# Patient Record
Sex: FEMALE | Race: WHITE | NOT HISPANIC OR LATINO | Employment: OTHER | URBAN - METROPOLITAN AREA
[De-identification: names, ages, dates, MRNs, and addresses within clinical notes are randomized per-mention and may not be internally consistent; named-entity substitution may affect disease eponyms.]

---

## 2020-09-17 ENCOUNTER — OFFICE VISIT (OUTPATIENT)
Dept: GASTROENTEROLOGY | Facility: CLINIC | Age: 70
End: 2020-09-17
Payer: MEDICARE

## 2020-09-17 VITALS — BODY MASS INDEX: 16.81 KG/M2 | HEIGHT: 67 IN | WEIGHT: 107.13 LBS

## 2020-09-17 DIAGNOSIS — Z92.89 HISTORY OF USE OF ALTERNATIVE MEDICINE: ICD-10-CM

## 2020-09-17 DIAGNOSIS — R62.7 POOR WEIGHT GAIN IN ADULT: Primary | ICD-10-CM

## 2020-09-17 PROCEDURE — 99999 PR PBB SHADOW E&M-NEW PATIENT-LVL III: CPT | Mod: PBBFAC,,, | Performed by: INTERNAL MEDICINE

## 2020-09-17 PROCEDURE — 99203 OFFICE O/P NEW LOW 30 MIN: CPT | Mod: S$GLB,,, | Performed by: INTERNAL MEDICINE

## 2020-09-17 PROCEDURE — 99999 PR PBB SHADOW E&M-NEW PATIENT-LVL III: ICD-10-PCS | Mod: PBBFAC,,, | Performed by: INTERNAL MEDICINE

## 2020-09-17 PROCEDURE — 99203 PR OFFICE/OUTPT VISIT, NEW, LEVL III, 30-44 MIN: ICD-10-PCS | Mod: S$GLB,,, | Performed by: INTERNAL MEDICINE

## 2020-09-17 RX ORDER — CREAM BASE NO.91
CREAM (GRAM) MISCELLANEOUS
COMMUNITY

## 2020-09-17 NOTE — PROGRESS NOTES
GASTROENTEROLOGY CLINIC NOTE    Reason for visit: The primary encounter diagnosis was Poor weight gain in adult. A diagnosis of History of use of alternative medicine was also pertinent to this visit.  Referring provider/PCP: Baltazar Meng MD    HPI:  Marie Dickson is a 70 y.o. female here today for inability to gain weight. New patient.  Hx of RA, recently stopped MTX.       Has a 10 year history of weight loss.  Although recently she states that her weight has been overall neutral over the past 3-5 years she states.  However she is concerned that she has not been able to gain any weight.  She is very heavily involved in alternative medicines. Seeing a  out in [place], meeting online.   Now on supplements. Extensive testing online [facility].     This  recommended multiple test from the [employer] panel.    She has undergone previous workup in the past with a GI doctor.  We do not have these records.  It was Dr. See.  She was told that she has increased fat in her stool.  She believes she was tried on Creon but that did not help.  She does not know if she has ever had a CT to look at her pancreas.  She has had prior upper and lower endoscopy back in 2017, she comments overall these seemed to be normal.  Otherwise no current GI symptoms.  No diarrhea, no vomiting, no blood in stool, no bowel changes, no abdominal pain.    Sees  dr. Baltazar meng. At [facility].   She confirms she recently had multiple blood tests and were all fairly normal.  She states she has had prior celiac testing and was negative.      Dad -  4 years ago at age 105  Mom -          Prior Endoscopy:  EGD:  2017  She believes normal    Colon:    [facility] - unsure, she thinks she was told to come back 5 years?    (Portions of this note were dictated using voice recognition software and may contain dictation related errors in spelling/grammar/syntax not found on text  review)    Review of Systems   Constitutional: Positive for weight loss. Negative for fever and malaise/fatigue.   HENT: Negative for nosebleeds and sore throat.    Eyes: Negative for double vision and photophobia.   Respiratory: Negative for cough and shortness of breath.    Cardiovascular: Negative for chest pain and leg swelling.   Gastrointestinal: Negative for abdominal pain, blood in stool, nausea and vomiting.   Genitourinary: Negative for dysuria and hematuria.   Musculoskeletal: Negative for joint pain and neck pain.   Skin: Negative for itching and rash.   Neurological: Negative for dizziness and headaches.   Psychiatric/Behavioral: Negative for hallucinations. The patient does not have insomnia.        Past Medical History: has a past medical history of Anxiety, DJD (degenerative joint disease), and Dry mouth.    Past Surgical History: has a past surgical history that includes Cosmetic surgery and  section.    Family History:family history includes Heart disease in her father; Inflammatory bowel disease in her mother; Stroke in her mother.    Allergies: Review of patient's allergies indicates:  No Known Allergies    Social History: reports that she has never smoked. She does not have any smokeless tobacco history on file. She reports current alcohol use of about 2.0 standard drinks of alcohol per week. She reports that she does not use drugs.    Home medications:   Current Outpatient Medications on File Prior to Visit   Medication Sig Dispense Refill    alendronate (FOSAMAX) 40 mg tablet Take 40 mg by mouth every 7 days.      cream base no.91, bulk, (HRT BASE BOTANICAL) Crea by Misc.(Non-Drug; Combo Route) route.      lorazepam (ATIVAN) 1 MG tablet Take 0.5 mg by mouth every evening.       progesterone (PROMETRIUM) 100 MG capsule Take 1 capsule (100 mg total) by mouth nightly. 12 capsule 11    UNABLE TO FIND Megapan      UNABLE TO FIND Paramin      UNABLE TO FIND Endoveggies      UNABLE TO  "FIND GB 3      [DISCONTINUED] estradiol (VIVELLE-DOT) 0.1 mg/24 hr PTSW Apply 1 patch to skin twice weekly as directed. (Patient not taking: Reported on 9/17/2020) 8 patch 12    [DISCONTINUED] KELP ORAL Take by mouth.      [DISCONTINUED] ketoconazole (NIZORAL) 200 mg Tab   0    [DISCONTINUED] LACTOBACILLUS ACIDOPHILUS (ACIDOPHILUS ORAL) Take by mouth.      [DISCONTINUED] meloxicam (MOBIC) 7.5 MG tablet   2    [DISCONTINUED] SYNTHROID 25 mcg tablet   2     No current facility-administered medications on file prior to visit.        Vital signs:  Ht 5' 7" (1.702 m)   Wt 48.6 kg (107 lb 2.3 oz)   BMI 16.78 kg/m²     Physical Exam  Vitals signs reviewed.   Constitutional:       General: She is not in acute distress.     Appearance: She is not diaphoretic.      Comments: Very thin appearing   HENT:      Head: Normocephalic and atraumatic.   Eyes:      General: No scleral icterus.     Conjunctiva/sclera: Conjunctivae normal.   Neck:      Musculoskeletal: Neck supple.   Cardiovascular:      Rate and Rhythm: Normal rate.      Heart sounds: Normal heart sounds.   Pulmonary:      Effort: Pulmonary effort is normal. No respiratory distress.      Breath sounds: Normal breath sounds. No stridor.   Abdominal:      General: There is no distension.      Palpations: Abdomen is soft. There is no mass.      Tenderness: There is no abdominal tenderness. There is no guarding or rebound.   Musculoskeletal:         General: No tenderness or deformity.   Lymphadenopathy:      Cervical: No cervical adenopathy.   Skin:     General: Skin is warm and dry.      Findings: No rash.   Neurological:      Mental Status: She is alert and oriented to person, place, and time.      Gait: Gait normal.   Psychiatric:         Mood and Affect: Mood normal.         Behavior: Behavior normal.         Routine labs:  Lab Results   Component Value Date    WBC 8.14 09/17/2008    HGB 13.9 09/17/2008    HCT 42.3 09/17/2008    MCV 99.1 (H) 09/17/2008    PLT " 270 09/17/2008     No results found for: INR  No results found for: IRON, FERRITIN, TIBC, FESATURATED  Lab Results   Component Value Date     09/17/2008    K 4.2 09/17/2008     09/17/2008    CO2 25 09/17/2008    BUN 18 09/17/2008    CREATININE 0.9 09/17/2008     No results found for: ALBUMIN, ALT, AST, GGT, ALKPHOS, BILITOT  No results found for: GLUCOSE    I have reviewed prior labs, imaging, notes from last month      Assessment:  1. Poor weight gain in adult    2. History of use of alternative medicine      She is requesting [facility] testing to include:  NutrEval Plasma  GI effects  Celiac / sensitivity testing.    Plan:     Will arrange testing thru [facility]    Obtain records from [facility] regarding prior GI workup / procedures.    I think it would be reasonable to consider CT panc protocol in future if truly had excess fat in stool. Would also re-consider trial of zenpep.    RTC based on these results.       Sen Espinoza MD  [facility] [place]

## 2020-09-18 ENCOUNTER — PATIENT MESSAGE (OUTPATIENT)
Dept: GASTROENTEROLOGY | Facility: CLINIC | Age: 70
End: 2020-09-18

## 2020-10-16 ENCOUNTER — PATIENT MESSAGE (OUTPATIENT)
Dept: GASTROENTEROLOGY | Facility: CLINIC | Age: 70
End: 2020-10-16

## 2020-10-22 ENCOUNTER — TELEPHONE (OUTPATIENT)
Dept: GASTROENTEROLOGY | Facility: CLINIC | Age: 70
End: 2020-10-22

## 2020-10-22 NOTE — TELEPHONE ENCOUNTER
Spoke with pt and she is having diarrhea. Sooner appointment scheduled on 10/26/20 at 8:00am for a virtual visit.

## 2020-10-22 NOTE — TELEPHONE ENCOUNTER
----- Message from Alem Valladares sent at 10/22/2020  3:17 PM CDT -----  Contact: SELF [phone]  Patient would like to speak with you about being seen sooner Please advise

## 2020-10-23 ENCOUNTER — PATIENT MESSAGE (OUTPATIENT)
Dept: GASTROENTEROLOGY | Facility: CLINIC | Age: 70
End: 2020-10-23

## 2020-11-05 ENCOUNTER — OFFICE VISIT (OUTPATIENT)
Dept: GASTROENTEROLOGY | Facility: CLINIC | Age: 70
End: 2020-11-05
Payer: MEDICARE

## 2020-11-05 DIAGNOSIS — K86.81 EXOCRINE PANCREATIC INSUFFICIENCY: Primary | ICD-10-CM

## 2020-11-05 PROCEDURE — 99214 OFFICE O/P EST MOD 30 MIN: CPT | Mod: 95,,, | Performed by: INTERNAL MEDICINE

## 2020-11-05 PROCEDURE — 99214 PR OFFICE/OUTPT VISIT, EST, LEVL IV, 30-39 MIN: ICD-10-PCS | Mod: 95,,, | Performed by: INTERNAL MEDICINE

## 2020-11-05 RX ORDER — PANCRELIPASE 36000; 180000; 114000 [USP'U]/1; [USP'U]/1; [USP'U]/1
1 CAPSULE, DELAYED RELEASE PELLETS ORAL
Qty: 90 CAPSULE | Refills: 2 | Status: SHIPPED | OUTPATIENT
Start: 2020-11-05 | End: 2020-12-05

## 2020-11-05 NOTE — PROGRESS NOTES
The patient location is: [place]  The chief complaint leading to consultation is: follow up diarrhea    Visit type: audiovisual    Face to Face time with patient: 20  59 minutes of total time spent on the encounter, which includes face to face time and non-face to face time preparing to see the patient (eg, review of tests), Obtaining and/or reviewing separately obtained history, Documenting clinical information in the electronic or other health record, Independently interpreting results (not separately reported) and communicating results to the patient/family/caregiver, or Care coordination (not separately reported).         Each patient to whom he or she provides medical services by telemedicine is:  (1) informed of the relationship between the physician and patient and the respective role of any other health care provider with respect to management of the patient; and (2) notified that he or she may decline to receive medical services by telemedicine and may withdraw from such care at any time.         GASTROENTEROLOGY CLINIC NOTE    Reason for visit: The encounter diagnosis was Exocrine pancreatic insufficiency.  Referring provider/PCP: Baltazar Meng MD    HPI:  Marie Dickson is a 70 y.o. female here today for inability to gain weight follow up    Interval  She had COVID couple weeks ago, had only diarrhea and fatigue type symptoms. Symptoms now resolved but still continues with her chronic loose stools. Unable to gain wt although on restricted diet. Avoids gluten. No fevers. No blood in stool. Feels she cant digest fat.  Reviewed her lyric tests with her. Will send her copies.    ================================  initital clinic visit    Has a 10 year history of weight loss.  Although recently she states that her weight has been overall neutral over the past 3-5 years she states.  However she is concerned that she has not been able to gain any weight.  She is very heavily involved in alternative  medicines. Seeing a  out in [place], meeting online.   Now on supplements. Extensive testing online [facility].     This  recommended multiple test from the [employer] diagnostics panel.    She has undergone previous workup in the past with a GI doctor.  We do not have these records.  It was Dr. See.  She was told that she has increased fat in her stool.  She believes she was tried on Creon but that did not help.  She does not know if she has ever had a CT to look at her pancreas.  She has had prior upper and lower endoscopy back in 2017, she comments overall these seemed to be normal.  Otherwise no current GI symptoms.  No diarrhea, no vomiting, no blood in stool, no bowel changes, no abdominal pain.    Sees nadeen jessica. At [facility].   She confirms she recently had multiple blood tests and were all fairly normal.  She states she has had prior celiac testing and was negative.      Dad -  4 years ago at age 105  Mom -          Prior Endoscopy:  EGD:  2017  She believes normal    Colon:    [facility] - unsure, she thinks she was told to come back 5 years?    (Portions of this note were dictated using voice recognition software and may contain dictation related errors in spelling/grammar/syntax not found on text review)    Review of Systems   Constitutional: Negative for fever and malaise/fatigue.   Respiratory: Negative for cough and shortness of breath.    Cardiovascular: Negative for chest pain and palpitations.   Gastrointestinal: Positive for diarrhea. Negative for abdominal pain, blood in stool, nausea and vomiting.   Neurological: Negative for dizziness and headaches.       Past Medical History: has a past medical history of Anxiety, DJD (degenerative joint disease), and Dry mouth.    Past Surgical History: has a past surgical history that includes Cosmetic surgery and  section.    Family History:family history includes Heart disease in her father;  Inflammatory bowel disease in her mother; Stroke in her mother.    Allergies: Review of patient's allergies indicates:  No Known Allergies    Social History: reports that she has never smoked. She does not have any smokeless tobacco history on file. She reports current alcohol use of about 2.0 standard drinks of alcohol per week. She reports that she does not use drugs.    Home medications:   Current Outpatient Medications on File Prior to Visit   Medication Sig Dispense Refill    alendronate (FOSAMAX) 40 mg tablet Take 40 mg by mouth every 7 days.      cream base no.91, bulk, (HRT BASE BOTANICAL) Crea by Misc.(Non-Drug; Combo Route) route.      lorazepam (ATIVAN) 1 MG tablet Take 0.5 mg by mouth every evening.       progesterone (PROMETRIUM) 100 MG capsule Take 1 capsule (100 mg total) by mouth nightly. 12 capsule 11    UNABLE TO FIND Megapan      UNABLE TO FIND Paramin      UNABLE TO FIND Endoveggies      UNABLE TO FIND GB 3       No current facility-administered medications on file prior to visit.        Vital signs:  There were no vitals taken for this visit.    Physical Exam  Vitals signs reviewed.   Constitutional:       Appearance: She is not toxic-appearing.   HENT:      Head: Normocephalic and atraumatic.   Eyes:      General: No scleral icterus.     Conjunctiva/sclera: Conjunctivae normal.   Neurological:      General: No focal deficit present.      Mental Status: She is alert and oriented to person, place, and time.   Psychiatric:         Mood and Affect: Mood normal.         Behavior: Behavior normal.         Routine labs:  Lab Results   Component Value Date    WBC 8.14 09/17/2008    HGB 13.9 09/17/2008    HCT 42.3 09/17/2008    MCV 99.1 (H) 09/17/2008     09/17/2008     No results found for: INR  No results found for: IRON, FERRITIN, TIBC, FESATURATED  Lab Results   Component Value Date     09/17/2008    K 4.2 09/17/2008     09/17/2008    CO2 25 09/17/2008    BUN 18 09/17/2008     CREATININE 0.9 09/17/2008     No results found for: ALBUMIN, ALT, AST, GGT, ALKPHOS, BILITOT  No results found for: GLUCOSE    I have reviewed prior labs, imaging, notes from last month    Review of prior old records:  2012 colon navid narayan small stomach polyps, biopsied erthema in stomach, biopsied gastritis nromal duodenum, biopsied PH = 2   colon PCF diverticulosis IC valve and TI biopsies normal colon mucosa, biopsied small polyps in rectum, biopsied   2017 egd / colon egd with HH from 39 to 44 duo , stomach and esophagus biospied colon - normal, biopsies for micro colitis   CT 2017 underdistended sigmoid degenerative lumbar spine   2017 esophagram unremarkable        Assessment:  1. Exocrine pancreatic insufficiency      Reviewed [facility] tests.  Will send to her alternative doctor in [facility] and also send copies to her.    Plan:  Orders Placed This Encounter    lipase-protease-amylase (CREON) 36,000-114,000- 180,000 unit CpDR     Awaiting stool studies    We discussed trial of Creon   - she had CT scan 2017 with / without and no signs of significant pathology, and these symptoms were present and unchanged at that time, thus at this time, we discussed that we do not need to repeat CT scan, but will consider if she has worsening or change of symptoms.      RTC based on these results.       Sen Espinoza MD  [facility] [place]    I spent 39 minutes revieweing and summarizing her prior medical records prior to todays visit , as summarized and mentioned above.

## 2020-11-06 ENCOUNTER — TELEPHONE (OUTPATIENT)
Dept: GASTROENTEROLOGY | Facility: CLINIC | Age: 70
End: 2020-11-06

## 2020-11-06 NOTE — TELEPHONE ENCOUNTER
----- Message from Danelle Montes MA sent at 11/5/2020  3:58 PM CST -----  [facility]  Caller: Unspecified (Today,  3:32 PM)         Type:  Needs Medical Advice     Who Called: patient   Reason: Doctors email to send results of lyric testing   Would the patient rather a call back or a response via [facility]? Email   Best Call Back Number: [email or web address]   Additional Information: Dr. Alejandra Meng number [phone]

## 2020-11-06 NOTE — TELEPHONE ENCOUNTER
----- Message from Geri Nicholson sent at 11/6/2020 11:14 AM CST -----  JYOTI BORDEN calling to give fax # to Dr. Alejandra Meng fax # [phone]@[email or web address]    Call back [phone]

## 2020-11-09 ENCOUNTER — TELEPHONE (OUTPATIENT)
Dept: GASTROENTEROLOGY | Facility: CLINIC | Age: 70
End: 2020-11-09

## 2020-11-09 NOTE — TELEPHONE ENCOUNTER
----- Message from Rj Dhaliwal sent at 11/9/2020 10:07 AM CST -----  Regarding: Records  Type:  Needs Medical Advice    Who Called:  patient  Would the patient rather a call back or a response via [facility]?  Call back  Best Call Back Number:  [phone]  Additional Information:  please mail her records form [facility] to her home

## 2020-12-04 ENCOUNTER — TELEPHONE (OUTPATIENT)
Dept: GASTROENTEROLOGY | Facility: CLINIC | Age: 70
End: 2020-12-04

## 2020-12-04 NOTE — TELEPHONE ENCOUNTER
Patient called requesting results from [employer]. Informed pt that we have not received any more results from them. Pt said that she will contact them and have them to fax the results to us.

## 2020-12-04 NOTE — TELEPHONE ENCOUNTER
----- Message from Tari Montes sent at 12/4/2020  2:10 PM CST -----  Contact: [phone]  Pt calling to speak with someone regarding her results. Please call the pt regarding her concerns.

## 2020-12-09 ENCOUNTER — TELEPHONE (OUTPATIENT)
Dept: GASTROENTEROLOGY | Facility: HOSPITAL | Age: 70
End: 2020-12-09

## 2020-12-09 NOTE — TELEPHONE ENCOUNTER
Called and discussed recent [facility]ipul results.    All questions answered.    Please mail her the results after we upload them to media.

## 2020-12-10 ENCOUNTER — TELEPHONE (OUTPATIENT)
Dept: GASTROENTEROLOGY | Facility: CLINIC | Age: 70
End: 2020-12-10

## 2020-12-10 NOTE — TELEPHONE ENCOUNTER
----- Message from Pamela Loaiza sent at 12/10/2020  3:13 PM CST -----  Contact: [phone]  Who Called: PT  Regarding: test results    Would the patient rather a call back or a response via [facility]? Call back  Best Call Back Number: [phone]  Additional Information: would like results faxed to pcp DR Ilene Meng fax [phone] @[email or web address]

## 2020-12-15 ENCOUNTER — TELEPHONE (OUTPATIENT)
Dept: GASTROENTEROLOGY | Facility: CLINIC | Age: 70
End: 2020-12-15

## 2021-04-16 ENCOUNTER — PATIENT MESSAGE (OUTPATIENT)
Dept: RESEARCH | Facility: HOSPITAL | Age: 71
End: 2021-04-16

## 2022-12-09 ENCOUNTER — OFFICE VISIT (OUTPATIENT)
Dept: URGENT CARE | Facility: CLINIC | Age: 72
End: 2022-12-09
Payer: MEDICARE

## 2022-12-09 VITALS
OXYGEN SATURATION: 96 % | DIASTOLIC BLOOD PRESSURE: 62 MMHG | RESPIRATION RATE: 14 BRPM | HEIGHT: 66 IN | BODY MASS INDEX: 16.88 KG/M2 | TEMPERATURE: 98 F | SYSTOLIC BLOOD PRESSURE: 117 MMHG | HEART RATE: 69 BPM | WEIGHT: 105 LBS

## 2022-12-09 DIAGNOSIS — J47.1 BRONCHIECTASIS WITH ACUTE EXACERBATION: Primary | ICD-10-CM

## 2022-12-09 DIAGNOSIS — R05.9 COUGH, UNSPECIFIED TYPE: ICD-10-CM

## 2022-12-09 PROBLEM — N95.2 ATROPHIC VAGINITIS: Status: ACTIVE | Noted: 2018-08-24

## 2022-12-09 PROBLEM — N60.11 DIFFUSE CYSTIC MASTOPATHY OF BOTH BREASTS: Status: ACTIVE | Noted: 2020-01-27

## 2022-12-09 PROBLEM — N60.12 DIFFUSE CYSTIC MASTOPATHY OF BOTH BREASTS: Status: ACTIVE | Noted: 2020-01-27

## 2022-12-09 PROBLEM — Z79.890 HORMONE REPLACEMENT THERAPY: Status: ACTIVE | Noted: 2020-01-27

## 2022-12-09 PROBLEM — N95.1 SYMPTOMATIC MENOPAUSAL OR FEMALE CLIMACTERIC STATES: Status: ACTIVE | Noted: 2020-01-27

## 2022-12-09 LAB
CTP QC/QA: YES
SARS-COV-2 AG RESP QL IA.RAPID: NEGATIVE

## 2022-12-09 PROCEDURE — 87811 SARS CORONAVIRUS 2 ANTIGEN POCT, MANUAL READ: ICD-10-PCS | Mod: QW,S$GLB,, | Performed by: FAMILY MEDICINE

## 2022-12-09 PROCEDURE — 99214 OFFICE O/P EST MOD 30 MIN: CPT | Mod: S$GLB,,, | Performed by: FAMILY MEDICINE

## 2022-12-09 PROCEDURE — 71046 X-RAY EXAM CHEST 2 VIEWS: CPT | Mod: S$GLB,,, | Performed by: RADIOLOGY

## 2022-12-09 PROCEDURE — 3008F BODY MASS INDEX DOCD: CPT | Mod: CPTII,S$GLB,, | Performed by: FAMILY MEDICINE

## 2022-12-09 PROCEDURE — 1125F PR PAIN SEVERITY QUANTIFIED, PAIN PRESENT: ICD-10-PCS | Mod: CPTII,S$GLB,, | Performed by: FAMILY MEDICINE

## 2022-12-09 PROCEDURE — 3008F PR BODY MASS INDEX (BMI) DOCUMENTED: ICD-10-PCS | Mod: CPTII,S$GLB,, | Performed by: FAMILY MEDICINE

## 2022-12-09 PROCEDURE — 1125F AMNT PAIN NOTED PAIN PRSNT: CPT | Mod: CPTII,S$GLB,, | Performed by: FAMILY MEDICINE

## 2022-12-09 PROCEDURE — 3078F DIAST BP <80 MM HG: CPT | Mod: CPTII,S$GLB,, | Performed by: FAMILY MEDICINE

## 2022-12-09 PROCEDURE — 1159F MED LIST DOCD IN RCRD: CPT | Mod: CPTII,S$GLB,, | Performed by: FAMILY MEDICINE

## 2022-12-09 PROCEDURE — 1160F RVW MEDS BY RX/DR IN RCRD: CPT | Mod: CPTII,S$GLB,, | Performed by: FAMILY MEDICINE

## 2022-12-09 PROCEDURE — 99214 PR OFFICE/OUTPT VISIT, EST, LEVL IV, 30-39 MIN: ICD-10-PCS | Mod: S$GLB,,, | Performed by: FAMILY MEDICINE

## 2022-12-09 PROCEDURE — 3078F PR MOST RECENT DIASTOLIC BLOOD PRESSURE < 80 MM HG: ICD-10-PCS | Mod: CPTII,S$GLB,, | Performed by: FAMILY MEDICINE

## 2022-12-09 PROCEDURE — 71046 XR CHEST PA AND LATERAL: ICD-10-PCS | Mod: S$GLB,,, | Performed by: RADIOLOGY

## 2022-12-09 PROCEDURE — 3074F SYST BP LT 130 MM HG: CPT | Mod: CPTII,S$GLB,, | Performed by: FAMILY MEDICINE

## 2022-12-09 PROCEDURE — 1160F PR REVIEW ALL MEDS BY PRESCRIBER/CLIN PHARMACIST DOCUMENTED: ICD-10-PCS | Mod: CPTII,S$GLB,, | Performed by: FAMILY MEDICINE

## 2022-12-09 PROCEDURE — 1159F PR MEDICATION LIST DOCUMENTED IN MEDICAL RECORD: ICD-10-PCS | Mod: CPTII,S$GLB,, | Performed by: FAMILY MEDICINE

## 2022-12-09 PROCEDURE — 87811 SARS-COV-2 COVID19 W/OPTIC: CPT | Mod: QW,S$GLB,, | Performed by: FAMILY MEDICINE

## 2022-12-09 PROCEDURE — 3074F PR MOST RECENT SYSTOLIC BLOOD PRESSURE < 130 MM HG: ICD-10-PCS | Mod: CPTII,S$GLB,, | Performed by: FAMILY MEDICINE

## 2022-12-09 RX ORDER — BENZONATATE 100 MG/1
CAPSULE ORAL
Qty: 30 CAPSULE | Refills: 1 | Status: SHIPPED | OUTPATIENT
Start: 2022-12-09

## 2022-12-09 RX ORDER — LEVOFLOXACIN 500 MG/1
500 TABLET, FILM COATED ORAL DAILY
Qty: 10 TABLET | Refills: 0 | Status: SHIPPED | OUTPATIENT
Start: 2022-12-09 | End: 2022-12-19

## 2022-12-09 RX ORDER — ONDANSETRON 4 MG/1
TABLET, FILM COATED ORAL
COMMUNITY
End: 2022-12-09 | Stop reason: ALTCHOICE

## 2022-12-09 NOTE — PROGRESS NOTES
"Subjective:       Patient ID: Marie Dickson is a 72 y.o. female.    Vitals:  height is 5' 6" (1.676 m) and weight is 47.6 kg (105 lb). Her temporal temperature is 98.3 °F (36.8 °C). Her blood pressure is 117/62 and her pulse is 69. Her respiration is 14 and oxygen saturation is 96%.     Chief Complaint: Cough    Patient co cough with chest congestion x for 10 days or so. Patient states that she ran fever last week but hasn't had any fever since. 3 days ago her PCP sent out a script for doxycycline . She doesn't feel much better yet since starting that. She tried her  albuterol inhaler which may have helped a little. She is breathing up lots of green discolored mucous.    Cough  This is a new problem. The current episode started 1 to 4 weeks ago. The problem has been unchanged. The problem occurs constantly. Associated symptoms include chills and wheezing. The symptoms are aggravated by lying down. She has tried OTC cough suppressant for the symptoms. The treatment provided mild relief. Her past medical history is significant for bronchiectasis.     Constitution: Positive for chills.   Respiratory:  Positive for cough and wheezing.      Objective:      Physical Exam   Constitutional: She is oriented to person, place, and time. She appears well-developed. She is cooperative.  Non-toxic appearance. She does not appear ill. No distress.   HENT:   Head: Normocephalic and atraumatic.   Ears:   Right Ear: Hearing, tympanic membrane, external ear and ear canal normal.   Left Ear: Hearing, tympanic membrane, external ear and ear canal normal.   Nose: Nose normal. No mucosal edema, rhinorrhea or nasal deformity. No epistaxis. Right sinus exhibits no maxillary sinus tenderness and no frontal sinus tenderness. Left sinus exhibits no maxillary sinus tenderness and no frontal sinus tenderness.   Mouth/Throat: Uvula is midline, oropharynx is clear and moist and mucous membranes are normal. No trismus in the jaw. " Normal dentition. No uvula swelling. No oropharyngeal exudate, posterior oropharyngeal edema or posterior oropharyngeal erythema.   Eyes: Conjunctivae and lids are normal. No scleral icterus.   Neck: Trachea normal and phonation normal. Neck supple. No edema present. No erythema present. No neck rigidity present.   Cardiovascular: Normal rate, regular rhythm, normal heart sounds and normal pulses.   Pulmonary/Chest: Effort normal. No respiratory distress. She has no decreased breath sounds. She has no rhonchi.         Comments: Decreased BS , slight mucous rhonchi (plugging)heard with deep inspiration    Abdominal: Normal appearance.   Musculoskeletal: Normal range of motion.         General: No deformity. Normal range of motion.   Neurological: She is alert and oriented to person, place, and time. She exhibits normal muscle tone. Coordination normal.   Skin: Skin is warm, dry, intact, not diaphoretic and not pale.   Psychiatric: Her speech is normal and behavior is normal. Judgment and thought content normal.   Nursing note and vitals reviewed.    Chest xray  The preliminary reading of your xray showed no acute changes. We will call you if the final read is different than what we discussed.   Assessment:       1. Bronchiectasis with acute exacerbation    2. Cough, unspecified type          Plan:         Bronchiectasis with acute exacerbation  -     levoFLOXacin (LEVAQUIN) 500 MG tablet; Take 1 tablet (500 mg total) by mouth once daily. for 10 days  Dispense: 10 tablet; Refill: 0  -     benzonatate (TESSALON PERLES) 100 MG capsule; 1 or 2 every 8 hours prn cough  Dispense: 30 capsule; Refill: 1    Cough, unspecified type  -     SARS Coronavirus 2 Antigen, POCT Manual Read  -     X-Ray Chest PA And Lateral; Future; Expected date: 12/09/2022